# Patient Record
(demographics unavailable — no encounter records)

---

## 2025-01-14 NOTE — HISTORY OF PRESENT ILLNESS
[FreeTextEntry1] : Reviewed prenatal problems requiring potential follow up for feedings, jaundice, kidney/brain/heart/other sonograms, and risk factors for hip dysplasia.   Hospital course and records available reviewed  3420 7-8Oz  7-3 Discharge  39 Weeks Mom A+ Mom Had RSV Shot TCB 7.4 12th TB 4.2 11th Mom and Dad Chinese but G6PD 15  Painful Latch

## 2025-01-14 NOTE — PHYSICAL EXAM
[Alert] : alert [Normocephalic] : normocephalic [Flat Open Anterior [place]] : flat open anterior fontanelle [PERRL] : PERRL [Red Reflex Bilateral] : red reflex bilateral [Normally Placed Ears] : normally placed ears [Auricles Well Formed] : auricles well formed [Clear Tympanic membranes] : clear tympanic membranes [Light reflex present] : light reflex present [Bony structures visible] : bony structures visible [Patent Auditory Canal] : patent auditory canal [Nares Patent] : nares patent [Palate Intact] : palate intact [Uvula Midline] : uvula midline [Supple, full passive range of motion] : supple, full passive range of motion [Symmetric Chest Rise] : symmetric chest rise [Clear to Auscultation Bilaterally] : clear to auscultation bilaterally [Regular Rate and Rhythm] : regular rate and rhythm [S1, S2 present] : S1, S2 present [+2 Femoral Pulses] : +2 femoral pulses [Soft] : soft [Bowel Sounds] : bowel sounds present [Umbilical Stump Dry, Clean, Intact] : umbilical stump dry, clean, intact [Normal external genitailia] : normal external genitalia [Central Urethral Opening] : central urethral opening [Testicles Descended Bilaterally] : testicles descended bilaterally [Patent] : patent [Normally Placed] : normally placed [No Abnormal Lymph Nodes Palpated] : no abnormal lymph nodes palpated [Symmetric Flexed Extremities] : symmetric flexed extremities [Startle Reflex] : startle reflex present [Suck Reflex] : suck reflex present [Rooting] : rooting reflex present [Palmar Grasp] : palmar grasp present [Plantar Grasp] : plantar reflex present [Symmetric Jean-Paul] : symmetric [place] [Acute Distress] : no acute distress [Icteric sclera] : nonicteric sclera [Discharge] : no discharge [Palpable Masses] : no palpable masses [Murmurs] : no murmurs [Tender] : nontender [Distended] : not distended [Hepatomegaly] : no hepatomegaly [Splenomegaly] : no splenomegaly [Lindo-Ortolani] : negative Lindo-Ortolani [Spinal Dimple] : no spinal dimple [Tuft of Hair] : no tuft of hair [Jaundice] : not jaundice [de-identified] : 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 Posterior tongue tie not amenable to ligation and not necessary to laser. Risks and benefits of ligation as well as risks and benefits of not ligating reviewed. Parent provided verbal consent for procedure. The anterior tie was isolated and ligated with less than 1 ml blood loss. Patient tolerated the procedure well. After care reviewed. Follow up as needed for feeding problems, bleeding, irritability, or lethargy. [de-identified] : Mild Jaundice to chest

## 2025-02-12 NOTE — PHYSICAL EXAM
[Alert] : alert [Acute Distress] : no acute distress [Normocephalic] : normocephalic [Flat Open Anterior [place]] : flat open anterior fontanelle [PERRL] : PERRL [Red Reflex Bilateral] : red reflex bilateral [Normally Placed Ears] : normally placed ears [Auricles Well Formed] : auricles well formed [Clear Tympanic membranes] : clear tympanic membranes [Light reflex present] : light reflex present [Bony landmarks visible] : bony landmarks visible [Discharge] : no discharge [Nares Patent] : nares patent [Palate Intact] : palate intact [Uvula Midline] : uvula midline [Supple, full passive range of motion] : supple, full passive range of motion [Palpable Masses] : no palpable masses [Symmetric Chest Rise] : symmetric chest rise [Clear to Auscultation Bilaterally] : clear to auscultation bilaterally [Regular Rate and Rhythm] : regular rate and rhythm [S1, S2 present] : S1, S2 present [Murmurs] : no murmurs [+2 Femoral Pulses] : +2 femoral pulses [Soft] : soft [Tender] : nontender [Distended] : not distended [Bowel Sounds] : bowel sounds present [Hepatomegaly] : no hepatomegaly [Splenomegaly] : no splenomegaly [Normal external genitailia] : normal external genitalia [Central Urethral Opening] : central urethral opening [Testicles Descended Bilaterally] : testicles descended bilaterally [Normally Placed] : normally placed [No Abnormal Lymph Nodes Palpated] : no abnormal lymph nodes palpated [Lindo-Ortolani] : negative Lindo-Ortolani [Symmetric Flexed Extremities] : symmetric flexed extremities [Spinal Dimple] : no spinal dimple [Tuft of Hair] : no tuft of hair [Startle Reflex] : startle reflex present [Suck Reflex] : suck reflex present [Rooting] : rooting reflex present [Palmar Grasp] : palmar grasp reflex present [Plantar Grasp] : plantar grasp reflex present [Symmetric Jean-Paul] : symmetric [place] [Jaundice] : no jaundice [Rash and/or lesion present] : no rash/lesion [FreeTextEntry6] : hydroceles

## 2025-02-12 NOTE — DISCUSSION/SUMMARY
[FreeTextEntry1] : Baby gaining weight beautifully. Normal NBS and G6PD records reviewed. Recommend exclusive breastfeeding, 8-12 feedings per day. Mother should continue prenatal vitamins and avoid alcohol. If formula is needed, recommend iron-fortified formulations, 2-4 oz every 2-3 hrs. When in car, patient should be in rear-facing car seat in back seat. Put baby to sleep on back, in own crib with no loose or soft bedding. Help baby to develop sleep and feeding routines. May offer pacifier if needed. Start tummy time when awake. Limit baby's exposure to others, especially those with fever or unknown vaccine status. Parents counseled to call if rectal temperature >100.4 degrees F.  Discussed possible reflux vs formula intolerance. Family to trial different formula. Consider PPI if symptoms do not improve.   Discussed 2m vaccines today. Return in 1 month for 2m WCC.

## 2025-02-12 NOTE — HISTORY OF PRESENT ILLNESS
[Parents] : parents [Formula ___ oz/feed] : [unfilled] oz of formula per feed [Normal] : Normal [In Bassinet/Crib] : sleeps in bassinet/crib [On back] : sleeps on back [Water heater temperature set at <120 degrees F] : Water heater temperature set at <120 degrees F [Rear facing car seat in back seat] : Rear facing car seat in back seat [Carbon Monoxide Detectors] : Carbon monoxide detectors at home [Smoke Detectors] : Smoke detectors at home.

## 2025-02-27 NOTE — PHYSICAL EXAM
[NL] : regular rate and rhythm, normal S1, S2 audible, no murmurs [Enlarged] : enlarged [Occipital] : occipital [Moves All Extremities x 4] : moves all extremities x4 [Warm] : warm [FreeTextEntry2] : < 1 cm mobile, firm mass at the junction of the parietal and occipital bones.

## 2025-02-27 NOTE — HISTORY OF PRESENT ILLNESS
[de-identified] : lump on head  [FreeTextEntry6] : cradle cap, parents are concerned about a lump on the back of the head.  No other concerns.  Feeding well, no illness.

## 2025-02-27 NOTE — DISCUSSION/SUMMARY
[FreeTextEntry1] : occipital lymphadenopathy vs. dermoid cyst.   Discussed cradle cap care with parents.   To re evaluate in about 4-6 weeks and can consider US if still present to r/o dermoid if still present.

## 2025-02-27 NOTE — HISTORY OF PRESENT ILLNESS
[de-identified] : lump on head  [FreeTextEntry6] : cradle cap, parents are concerned about a lump on the back of the head.  No other concerns.  Feeding well, no illness.

## 2025-03-06 NOTE — PHYSICAL EXAM
[No Acute Distress] : no acute distress [Normocephalic] : normocephalic [NL] : grossly EOMI, no discharge [Normotonic] : normotonic [Warm] : warm [de-identified] : left occipital area with 2 non tender, mobile nodes. ~ 0.5 cm x 1 and 0.75 cm x 1.  [de-identified] : cradle cap to the forehead.  Bilateral great toenail with mild erythema at the nail cuticle junction.

## 2025-03-06 NOTE — HISTORY OF PRESENT ILLNESS
[de-identified] : Red Toe  [FreeTextEntry6] : SUNDAR JOSÉ is a 1 month old male presenting for complaints of redness on both great toes and a second bump on the back of his head.   No fevers. Otherwise he is well.  He was last here on 2/17 with cradle cap and a left occipital lymph node vs dermoid cyst.  Father says that he feels another bump in the same area now.

## 2025-03-06 NOTE — DISCUSSION/SUMMARY
[FreeTextEntry1] : 55 do here with B paronychia's of the great toes and occipital lymphadenopathy.  mupriocin TID x 5 days with warm soaks to the feet.   Discussed natural hx of reactive lymphadenopathy given cradle cap.  Will monitor, advised parents that it can take 1-2 months for reactive lymph nodes to resolve.  If they become painful, red or worsening, parents will follow up.   Follow up PRN worsening symptoms, persistent fever of 100.4 or more or failure to improve.

## 2025-03-06 NOTE — HISTORY OF PRESENT ILLNESS
[de-identified] : Red Toe  [FreeTextEntry6] : SUNDAR JOSÉ is a 1 month old male presenting for complaints of redness on both great toes and a second bump on the back of his head.   No fevers. Otherwise he is well.  He was last here on 2/17 with cradle cap and a left occipital lymph node vs dermoid cyst.  Father says that he feels another bump in the same area now.

## 2025-03-06 NOTE — PHYSICAL EXAM
[No Acute Distress] : no acute distress [Normocephalic] : normocephalic [NL] : grossly EOMI, no discharge [Normotonic] : normotonic [Warm] : warm [de-identified] : left occipital area with 2 non tender, mobile nodes. ~ 0.5 cm x 1 and 0.75 cm x 1.  [de-identified] : cradle cap to the forehead.  Bilateral great toenail with mild erythema at the nail cuticle junction.

## 2025-03-14 NOTE — PHYSICAL EXAM
[Alert] : alert [Normocephalic] : normocephalic [Flat Open Anterior [place]] : flat open anterior fontanelle [PERRL] : PERRL [Red Reflex Bilateral] : red reflex bilateral [Normally Placed Ears] : normally placed ears [Auricles Well Formed] : auricles well formed [Clear Tympanic membranes] : clear tympanic membranes [Light reflex present] : light reflex present [Bony landmarks visible] : bony landmarks visible [Nares Patent] : nares patent [Palate Intact] : palate intact [Uvula Midline] : uvula midline [Supple, full passive range of motion] : supple, full passive range of motion [Symmetric Chest Rise] : symmetric chest rise [Clear to Auscultation Bilaterally] : clear to auscultation bilaterally [Regular Rate and Rhythm] : regular rate and rhythm [S1, S2 present] : S1, S2 present [+2 Femoral Pulses] : +2 femoral pulses [Soft] : soft [Bowel Sounds] : bowel sounds present [Normal external genitailia] : normal external genitalia [Central Urethral Opening] : central urethral opening [Testicles Descended Bilaterally] : testicles descended bilaterally [Normally Placed] : normally placed [No Abnormal Lymph Nodes Palpated] : no abnormal lymph nodes palpated [Symmetric Flexed Extremities] : symmetric flexed extremities [Startle Reflex] : startle reflex present [Suck Reflex] : suck reflex present [Rooting] : rooting reflex present [Palmar Grasp] : palmar grasp reflex present [Plantar Grasp] : plantar grasp reflex present [Symmetric Jean-[place]] : symmetric [place] [Acute Distress] : no acute distress [Discharge] : no discharge [Palpable Masses] : no palpable masses [Murmurs] : no murmurs [Tender] : nontender [Distended] : not distended [Hepatomegaly] : no hepatomegaly [Splenomegaly] : no splenomegaly [Lindo-Ortolani] : negative Lindo-Ortolani [Spinal Dimple] : no spinal dimple [Tuft of Hair] : no tuft of hair [Rash and/or lesion present] : no rash/lesion

## 2025-03-14 NOTE — HISTORY OF PRESENT ILLNESS
[Mother] : mother [Expressed Breast milk ___oz/feed] : [unfilled] oz of expressed breast milk per feed [Formula ___ oz/feed] : [unfilled] oz of formula per feed [Normal] : Normal [Yellow] : yellow [Seedy] : seedy [In Bassinet/Crib] : sleeps in bassinet/crib [Water heater temperature set at <120 degrees F] : Water heater temperature set at <120 degrees F [Rear facing car seat in back seat] : Rear facing car seat in back seat [Carbon Monoxide Detectors] : Carbon monoxide detectors at home [Smoke Detectors] : Smoke detectors at home. [de-identified] : Still having some spit ups. Having difficulty sleeping at night for long stretches. Detail Level: Detailed [de-identified] : Similac sensitive ready to feed, doing better on this formula Detail Level: Zone

## 2025-03-14 NOTE — DISCUSSION/SUMMARY
[] : The components of the vaccine(s) to be administered today are listed in the plan of care. The disease(s) for which the vaccine(s) are intended to prevent and the risks have been discussed with the caretaker.  The risks are also included in the appropriate vaccination information statements which have been provided to the patient's caregiver.  The caregiver has given consent to vaccinate. [FreeTextEntry1] : 2m baby here for WCC, gaining weight and developing beautifully. Discussed sleeping, feeding, stooling, and tummy time. Will receive Vaxelis today. Will come back next month for PCV20 and Rota. Return in 2 months for 4m WCC.  Possible silent reflux. Discussed continuing normal feeds and supportive care with reflux precautions, can consider a small amount of cereal in the bottle closer to 4m if symptoms not improving. Can also consider PPI if needed.  When in car, patient should be in rear-facing car seat in back seat. Put baby to sleep on back, in own crib with no loose or soft bedding. Help baby to maintain sleep and feeding routines. May offer pacifier if needed. Continue tummy time when awake. Parents counseled to call if rectal temperature >100.4 degrees F. Return precautions given.

## 2025-04-14 NOTE — DISCUSSION/SUMMARY
[FreeTextEntry1] : Chief Complaint: Presents to review immunization needs. Discussion with provider.  Nurse reviewed vaccines with provider/ACIP recommendation.   No current symptoms or PMH contraindicating vaccination.  Expectant care. Follow up as needed for fever trend, new, or concerning symptoms.  [] : The components of the vaccine(s) to be administered today are listed in the plan of care. The disease(s) for which the vaccine(s) are intended to prevent and the risks have been discussed with the caretaker.  The risks are also included in the appropriate vaccination information statements which have been provided to the patient's caregiver.  The caregiver has given consent to vaccinate.

## 2025-04-24 NOTE — DISCUSSION/SUMMARY
[FreeTextEntry1] : Anticipatory guidance and parent education given. Will increase Famotidine dosing based on today's weight.   To reduce reflux symptoms follow reflux precautions. Keep the baby upright after eating for 20 to 30 minutes after a feeding.  Keep baby away from cigarette smoke. Thicken formula may be helpful. Add 1-2 tablespoons of oat baby cereal to baby's bottle. Alternatively, activate rice formula is available. Mom to drop off diaper tomorrow to rule out MPI.

## 2025-04-24 NOTE — HISTORY OF PRESENT ILLNESS
[de-identified] : difficulty sleeping [FreeTextEntry6] : BIB mom with c/o irritability and difficulty sleeping.  Pt was started on Famotidine for GERD and was recently increased to BID a few weeks ago. Mom felt it was helping at first but now pt seems uncomfortable again.  Good weight gain since last visit.  May increase dose. Requesting to check diaper for milk allergy

## 2025-05-13 NOTE — HISTORY OF PRESENT ILLNESS
[Parents] : parents [Formula ___ oz/feed] : [unfilled] oz of formula per feed [In Bassinet/Crib] : sleeps in bassinet/crib [Sleeps 12-16 hours per 24 hours (including naps)] : sleeps 12-16 hours per 24 hours (including naps) [Pacifier use] : Pacifier use [Tummy time] : tummy time [Water heater temperature set at <120 degrees F] : Water heater temperature set at <120 degrees F [Rear facing car seat in back seat] : Rear facing car seat in back seat [Carbon Monoxide Detectors] : Carbon monoxide detectors at home [Smoke Detectors] : Smoke detectors at home. [de-identified] : Reflux. Famotidine seems to be helping but still having significant spit ups and back arching.  [de-identified] : Sim Sensitive ready to feed

## 2025-05-13 NOTE — PHYSICAL EXAM
[Alert] : alert [Acute Distress] : no acute distress [Normocephalic] : normocephalic [Flat Open Anterior [place]] : flat open anterior fontanelle [Red Reflex] : red reflex bilateral [PERRL] : PERRL [Normally Placed Ears] : normally placed ears [Auricles Well Formed] : auricles well formed [Clear Tympanic membranes] : clear tympanic membranes [Light reflex present] : light reflex present [Bony landmarks visible] : bony landmarks visible [Discharge] : no discharge [Nares Patent] : nares patent [Palate Intact] : palate intact [Uvula Midline] : uvula midline [Palpable Masses] : no palpable masses [Symmetric Chest Rise] : symmetric chest rise [Clear to Auscultation Bilaterally] : clear to auscultation bilaterally [Regular Rate and Rhythm] : regular rate and rhythm [S1, S2 present] : S1, S2 present [Murmurs] : no murmurs [+2 Femoral Pulses] : (+) 2 femoral pulses [Soft] : soft [Tender] : nontender [Distended] : nondistended [Bowel Sounds] : bowel sounds present [Hepatomegaly] : no hepatomegaly [Splenomegaly] : no splenomegaly [Normal External Genitalia] : normal external genitalia [Circumcised] : circumcised [Central Urethral Opening] : central urethral opening [Testicles Descended] : testicles descended bilaterally [Patent] : patent [Normally Placed] : normally placed [No Abnormal Lymph Nodes Palpated] : no abnormal lymph nodes palpated [Lindo-Ortolani] : negative Lindo-Ortolani [Allis Sign] : negative Allis sign [Spinal Dimple] : no spinal dimple [Tuft of Hair] : no tuft of hair [Startle Reflex] : startle reflex present [Plantar Grasp] : plantar grasp reflex present [Symmetric Jean-Paul] : symmetric jean-paul [Rash or Lesions] : no rash/lesions

## 2025-05-13 NOTE — DISCUSSION/SUMMARY
[] : The components of the vaccine(s) to be administered today are listed in the plan of care. The disease(s) for which the vaccine(s) are intended to prevent and the risks have been discussed with the caretaker.  The risks are also included in the appropriate vaccination information statements which have been provided to the patient's caregiver.  The caregiver has given consent to vaccinate. [FreeTextEntry1] : 4m WCC growing and developing beautifully. Discussed sleeping, feeding, reflux, tummy time, and vaccines. Will get Vaxelis today. Will return in 1 month for PCV20 and Rota, as well as weight check for GERD. Return in 2 months for 6m WCC.  Baby maximized on Famotidine at 1mg/kg/dose BID. Start solids soon, may help. Try to do smaller, more frequent feeds. Trial Elecare or Alimentum for possible milk protein intolerance as baby is still having bloating and discomfort with feeds. Discussed Gelmix as formula thickener. Referral to GI placed.  Continue feeding ad dickson. When in car, patient should be in rear-facing car seat in back seat. Put baby to sleep on back, in own crib with no loose or soft bedding. Lower crib mattress. Help baby to maintain sleep and feeding routines. May offer pacifier if needed. Continue tummy time when awake.

## 2025-06-20 NOTE — DISCUSSION/SUMMARY
[FreeTextEntry1] : feeding choices to reduce risk of allergy reviewed   DC famotidine   Trial yogurt at 9 mos   Risks of spreading vaccines reviewed  [] : The components of the vaccine(s) to be administered today are listed in the plan of care. The disease(s) for which the vaccine(s) are intended to prevent and the risks have been discussed with the caretaker.  The risks are also included in the appropriate vaccination information statements which have been provided to the patient's caregiver.  The caregiver has given consent to vaccinate.

## 2025-06-20 NOTE — HISTORY OF PRESENT ILLNESS
[de-identified] : follow up [FreeTextEntry6] : Feeding well Wants to know if they can try off famotidine

## 2025-06-30 NOTE — PHYSICAL EXAM
[NL] : warm, clear [FreeTextEntry3] :  Bilateral impacted cerumen removed with curette. B injected opaque TMS R>L

## 2025-06-30 NOTE — DISCUSSION/SUMMARY
[FreeTextEntry1] : Untreated ear infections may lead to:  Permanent hearing loss Problems with speech and language development Spread of infection to neighboring tissues and organs, such as mastoiditis or meningitis   Treat as directed and follow up as needed for fever trend, new, or worsening symptoms.  Call or return if rash or significant vomiting develops after starting medication. Some side effects of medication that may not need special treatments include loose BMs.  Continue reflux

## 2025-06-30 NOTE — HISTORY OF PRESENT ILLNESS
[de-identified] : fever [FreeTextEntry6] : Cranky 2-3 days T max 100.8 Eating fair Not lethargic

## 2025-07-02 NOTE — DISCUSSION/SUMMARY
[FreeTextEntry1] : 5 mo here with day 5 fever, croupy cough with stridor during sleep, dehydration and bilateral AOM.  Discussed all of the above with Mom and Grandmother and recommended that child be taken to the ED at [facility] now. Called [facility] ER and sign out was given to resident MD.  Tylenol 3.3 ml given PO at 1130 for fever.  Will follow up with mom via phone and upon discharge.   Spoke with mother at 7PM, child dx'd with epiglottitis and abscess of the neck.  Will be going to OR tonight for drainage with ENT.  Support given to mother, will f/u in the AM via phone.

## 2025-07-02 NOTE — DISCUSSION/SUMMARY
[FreeTextEntry1] : 5 mo here with day 5 fever, croupy cough with stridor during sleep, dehydration and bilateral AOM.  Discussed all of the above with Mom and Grandmother and recommended that child be taken to the ED at [facility] now. Called [facility] ER and sign out was given to resident MD.  Tylenol 3.3 ml given PO at 1130 for fever.  Will follow up with mom via phone and upon discharge.   Spoke with mother at 7PM, child dx'd with epiglottitis and abscess of the neck.  Will be going to OR tonight for drainage with ENT.  Support given to mother, will f/u in the AM via phone.   Dior Lewis RN

## 2025-07-02 NOTE — HISTORY OF PRESENT ILLNESS
[FreeTextEntry6] : SUNDAR JOSÉ is a 5 month old male presenting for complaints of croupy cough. Was seen for ear infection on 6/30 and started on Amoxicillin.  Mom states that he had tactile temperature on Saturday and Sunday, not measured but felt hot.  Last 3 days, measuring temps of 101-102, yesterday had 10 ounces PO and light wet diapers.  Today he is refusing PO, noisy breathing with sleep, no wet diapers.   Croupy cough sounds wet and worse than last few days.

## 2025-07-02 NOTE — PHYSICAL EXAM
[Tired appearing] : tired appearing [Lethargic] : lethargic [Normocephalic] : normocephalic [Erythema] : erythema [NL] : pink nasal mucosa [Erythematous Oropharynx] : erythematous oropharynx [Clear to Auscultation Bilaterally] : clear to auscultation bilaterally [Transmitted Upper Airway Sounds] : transmitted upper airway sounds [Normal S1, S2 audible] : normal S1, S2 audible [Tachycardia] : tachycardia [Soft] : soft [No Abnormal Lymph Nodes Palpated] : no abnormal lymph nodes palpated [Warm] : warm [Crackles] : no crackles [Rhonchi] : no rhonchi [Murmurs] : no murmurs [Tender] : nontender [Distended] : nondistended

## 2025-07-08 NOTE — REASON FOR VISIT
[Initial Evaluation] : an initial evaluation for [Stridor/Noisy Breathing] : stridor/noisy breathing [Mother] : mother [FreeTextEntry2] : hospital follow up

## 2025-07-08 NOTE — PHYSICAL EXAM
[Normal Gait and Station] : normal gait and station [Normal muscle strength, symmetry and tone of facial, head and neck musculature] : normal muscle strength, symmetry and tone of facial, head and neck musculature [Normal] : no cervical lymphadenopathy [Effusion] : no effusion [Exposed Vessel] : left anterior vessel not exposed [Increased Work of Breathing] : no increased work of breathing with use of accessory muscles and retractions [de-identified] : well appearing [de-identified] : palate intact

## 2025-07-08 NOTE — PHYSICAL EXAM
[Normal Gait and Station] : normal gait and station [Normal muscle strength, symmetry and tone of facial, head and neck musculature] : normal muscle strength, symmetry and tone of facial, head and neck musculature [Normal] : no cervical lymphadenopathy [Effusion] : no effusion [Exposed Vessel] : left anterior vessel not exposed [Increased Work of Breathing] : no increased work of breathing with use of accessory muscles and retractions [de-identified] : well appearing [de-identified] : palate intact

## 2025-07-08 NOTE — ASSESSMENT
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 Risks including but not limited to pain, bleeding, swelling, infection, scarring damage to lips, teeth, gums, parts of the oral cavity, oropharynx and airway, risk for further procedures, and risk of anesthesia (which will be discussed with you by the anesthesiologist).  Benefits include the diagnosis or surveillance of an underlying condition and treatment of an underlying condition. Alternatives include observation, and other diagnostic studies. During observation, if there is an underlying condition there is a risk that it could progress. Photodocumentation including pictures and video with or without sound may be taken, and effort will be made to maintain respect for privacy and confidentiality.

## 2025-07-08 NOTE — HISTORY OF PRESENT ILLNESS
[de-identified] : Today I had the pleasure of seeing SUNDAR JOSÉ. SUNDAR is a 5 month boy who presents for: hospital follow up History was obtained from patient, parent and chart.   Admitted to [facility] following 5 days of fevers, poor PO, stridor, bilateral AOM, discomfort when supine.  in the ER FFL with severe epiglottic edema and airway obstruction consistent with retropharyngeal abscess with edema,  significant improvement in breathing following one dose of decadron sufficient improvement to obtain imaging CT neck with large abscess abutting the airway s/p DLB I&D pharyngoepiglottic cyst copious mahsa purulence 7/2/25 required postop intubation until 7/4/25 and was discharged on 7/7/25 on antibiotics   [de-identified] : breathing improving, denies stridor  No respiratory distress Taking 4 ounces per feed, slow to feed on abx until 6/16

## 2025-07-08 NOTE — CONSULT LETTER
[Dear  ___] : Dear  [unfilled], [Courtesy Letter:] : I had the pleasure of seeing your patient, [unfilled], in my office today. [Please see my note below.] : Please see my note below. [Consult Closing:] : Thank you very much for allowing me to participate in the care of this patient.  If you have any questions, please do not hesitate to contact me. [Sincerely,] : Sincerely, [FreeTextEntry2] : Dr. Daniel Maxwell [address] [place] [address]  [phone] [FreeTextEntry3] : Cleopatra Prasad MD Pediatric Otolaryngology / Head and Neck Surgery    [facility] [address] [place] [address] Tel [phone] Fax [phone]    2 [facility], [facility] [address] [place] [address]  Tel [phone] Fax [phone]

## 2025-07-08 NOTE — CONSULT LETTER
[Dear  ___] : Dear  [unfilled], [Courtesy Letter:] : I had the pleasure of seeing your patient, [unfilled], in my office today. [Please see my note below.] : Please see my note below. [Consult Closing:] : Thank you very much for allowing me to participate in the care of this patient.  If you have any questions, please do not hesitate to contact me. [Sincerely,] : Sincerely, [FreeTextEntry2] : Dr. Daniel Maxwell [address] [place] [address]  [phone] [FreeTextEntry3] : Cleopatra Prasad MD Pediatric Otolaryngology / Head and Neck Surgery    [facility] [address] [place] [address] Tel [phone] Fax [phone]    0 [facility], [facility] [address] [place] [address]  Tel [phone] Fax [phone]

## 2025-07-08 NOTE — HISTORY OF PRESENT ILLNESS
[de-identified] : Today I had the pleasure of seeing SUNDAR JOSÉ. SUNDAR is a 5 month boy who presents for: hospital follow up History was obtained from patient, parent and chart.   [place] [facility] [place] 5 days of fevers, poor PO, stridor, bilateral AOM, discomfort when supine.  in the ER FFL with severe epiglottic edema and airway obstruction consistent with retropharyngeal abscess with edema,  significant improvement in breathing following one dose of decadron sufficient improvement to obtain imaging CT neck with large abscess abutting the airway s/p DLB I&D pharyngoepiglottic cyst copious mahsa purulence 7/2/25 required postop intubation until 7/4/25 and was discharged on 7/7/25 on antibiotics   [de-identified] : breathing improving, denies stridor  No respiratory distress Taking 4 ounces per feed, slow to feed on abx until 6/16

## 2025-07-17 NOTE — DATA REVIEWED
[Clinical lab tests/radiology test reviewed] : clinical lab tests/radiology test reviewed [Reviewed and summarized previous records] : reviewed and summarized previous records [de-identified] : Platelet count elevated after surgery (842K)

## 2025-07-17 NOTE — CONSULT LETTER
[Dear  ___] : Dear  [unfilled], [Courtesy Letter:] : I had the pleasure of seeing your patient, [unfilled], in my office today. [Please see my note below.] : Please see my note below. [Consult Closing:] : Thank you very much for allowing me to participate in the care of this patient.  If you have any questions, please do not hesitate to contact me. [Sincerely,] : Sincerely, [FreeTextEntry3] : Carter Houston MD Infectious Diseases, [facility] Professor, [facility] [employer] [place] [address] Tel: [phone]  Fax: [phone]

## 2025-07-17 NOTE — REASON FOR VISIT
[Follow-Up Consultation] : a follow-up consultation visit for [Mother] : mother [Medical Records] : medical records [FreeTextEntry3] : parapharyngeal abscess

## 2025-07-17 NOTE — HISTORY OF PRESENT ILLNESS
[FreeTextEntry2] : FROM DISCHARGE SUMMARY: Attendinm with [facility] s/p OR on . 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 Will continue augmentin. F/u ENT on 7/10 and ID in 1-2 weeks.  Telehealth follow-up visit today 25: The service was provided using 2-way audio visual platform -              The location of the patient was their home -              The location of the provider was [facility] -              Mom and grandma were present on video, and briefly showed Nely. Luca has behaved normally after he went home. He has been taking more than 8 oz each feed, and the sleeping schedule is back on track. Mom notes slight snoring, but no stridor. He has already completed the course of amoxicillin clavulanic acid he was sent home on. He saw Dr. Prasad this morning. Cyst removal is scheduled for .

## 2025-07-17 NOTE — HISTORY OF PRESENT ILLNESS
[de-identified] : Today I had the pleasure of seeing SUNDAR JOSÉ for follow up.  History was obtained from patient, mother and chart. 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

## 2025-07-17 NOTE — CONSULT LETTER
[Dear  ___] : Dear  [unfilled], [Courtesy Letter:] : I had the pleasure of seeing your patient, [unfilled], in my office today. [Please see my note below.] : Please see my note below. [Consult Closing:] : Thank you very much for allowing me to participate in the care of this patient.  If you have any questions, please do not hesitate to contact me. [Sincerely,] : Sincerely, [FreeTextEntry2] : Dr. Daniel Maxwell [address] [place] [address]  [phone] [FreeTextEntry3] : Cleopatra Prasad MD Pediatric Otolaryngology / Head and Neck Surgery    [facility] [address] [place] [address] Tel [phone] Fax [phone]    2 [facility], [facility] [address] [place] [address]  Tel [phone] Fax [phone]

## 2025-07-17 NOTE — ASSESSMENT
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 Risks including but not limited to pain, bleeding, swelling, infection, scarring damage to lips, teeth, gums, parts of the oral cavity, oropharynx and airway, risk for further procedures, and risk of anesthesia (which will be discussed with you by the anesthesiologist).  Benefits include the diagnosis or surveillance of an underlying condition and treatment of an underlying condition. Alternatives include observation, and other diagnostic studies. During observation, if there is an underlying condition there is a risk that it could progress. Photodocumentation including pictures and video with or without sound may be taken, and effort will be made to maintain respect for privacy and confidentiality.

## 2025-07-17 NOTE — PHYSICAL EXAM
[Normal Gait and Station] : normal gait and station [Normal muscle strength, symmetry and tone of facial, head and neck musculature] : normal muscle strength, symmetry and tone of facial, head and neck musculature [Normal] : no cervical lymphadenopathy [Effusion] : no effusion [Exposed Vessel] : left anterior vessel not exposed [Increased Work of Breathing] : no increased work of breathing with use of accessory muscles and retractions [de-identified] : well appearing [de-identified] : palate intact

## 2025-07-24 NOTE — HISTORY OF PRESENT ILLNESS
[de-identified] : Today I had the pleasure of seeing SUNDAR JOSÉ. SUNDAR is a 6 month boy who presents for: pharyngoepiglolttic cysth History was obtained from patient, parent and chart.  s/p DLB I&D pharyngoepiglottic cyst copious mahsa purulence 7/2/25 required postop intubation until 7/4/25 and was discharged on 7/7/25 on antibiotics [de-identified] : doing well, no noisy breathing, when waking up sounds like he has some secretions he needs to clear

## 2025-07-24 NOTE — PHYSICAL EXAM
[Effusion] : no effusion [Exposed Vessel] : left anterior vessel not exposed [Increased Work of Breathing] : no increased work of breathing with use of accessory muscles and retractions [Normal Gait and Station] : normal gait and station [Normal muscle strength, symmetry and tone of facial, head and neck musculature] : normal muscle strength, symmetry and tone of facial, head and neck musculature [Normal] : no cervical lymphadenopathy [de-identified] : well appearing [de-identified] : palate intact

## 2025-07-24 NOTE — ASSESSMENT
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 Risks including but not limited to pain, bleeding, swelling, infection, scarring damage to lips, teeth, gums, parts of the oral cavity, oropharynx and airway, risk for further procedures, and risk of anesthesia (which will be discussed with you by the anesthesiologist).  Benefits include the diagnosis or surveillance of an underlying condition and treatment of an underlying condition. Alternatives include observation, and other diagnostic studies. During observation, if there is an underlying condition there is a risk that it could progress. Photodocumentation including pictures and video with or without sound may be taken, and effort will be made to maintain respect for privacy and confidentiality.

## 2025-07-28 NOTE — HISTORY OF PRESENT ILLNESS
[Mother] : mother [Formula ___ oz/feed] : [unfilled] oz of formula per feed [Fruits] : fruits [Vegetables] : vegetables [Normal] : Normal [In Bassinet/Crib] : sleeps in bassinet/crib [Sleeps 12-16 hours per 24 hours (including naps)] : sleeps 12-16 hours per 24 hours (including naps) [Tummy time] : tummy time [Water heater temperature set at <120 degrees F] : Water heater temperature set at <120 degrees F [Rear facing car seat in back seat] : Rear facing car seat in back seat [Carbon Monoxide Detectors] : Carbon monoxide detectors at home [Smoke Detectors] : Smoke detectors at home. [de-identified] : Has pharyngoepiglottic cyst, following ENT, has excision and marsupialization of cyst on 08/20/25. [de-identified] : Slow start on solids due to cyst

## 2025-07-28 NOTE — DEVELOPMENTAL MILESTONES
[Normal Development] : Normal Development [Begins to turn when name called] : begins to turn when name called [Babbles] : babbles [Rolls over prone to supine] : rolls over prone to supine [Sits briefly without support] : sits briefly without support [Reaches for object and transfers] : reaches for object and transfers [Rakes small object with 4 fingers] : rakes small object with 4 fingers [[place] small object on surface] : bangs small object on surface [Passed] : passed

## 2025-07-28 NOTE — PHYSICAL EXAM
[Alert] : alert [Normocephalic] : normocephalic [Flat Open Anterior [place]] : flat open anterior fontanelle [Red Reflex] : red reflex bilateral [PERRL] : PERRL [Normally Placed Ears] : normally placed ears [Auricles Well Formed] : auricles well formed [Clear Tympanic membranes] : clear tympanic membranes [Light reflex present] : light reflex present [Bony landmarks visible] : bony landmarks visible [Nares Patent] : nares patent [Palate Intact] : palate intact [Uvula Midline] : uvula midline [Supple, full passive range of motion] : supple, full passive range of motion [Symmetric Chest Rise] : symmetric chest rise [Clear to Auscultation Bilaterally] : clear to auscultation bilaterally [Regular Rate and Rhythm] : regular rate and rhythm [S1, S2 present] : S1, S2 present [+2 Femoral Pulses] : (+) 2 femoral pulses [Soft] : soft [Bowel Sounds] : bowel sounds present [Central Urethral Opening] : central urethral opening [Testicles Descended] : testicles descended bilaterally [Patent] : patent [Normally Placed] : normally placed [No Abnormal Lymph Nodes Palpated] : no abnormal lymph nodes palpated [Symmetric Buttocks Creases] : symmetric buttocks creases [Plantar Grasp] : plantar grasp reflex present [Cranial Nerves Grossly Intact] : cranial nerves grossly intact [Acute Distress] : no acute distress [Discharge] : no discharge [Tooth Eruption] : tooth eruption [Palpable Masses] : no palpable masses [Murmurs] : no murmurs [Tender] : nontender [Distended] : nondistended [Hepatomegaly] : no hepatomegaly [Splenomegaly] : no splenomegaly [Lindo-Ortolani] : negative Lindo-Ortolani [Allis Sign] : negative Allis sign [Spinal Dimple] : no spinal dimple [Tuft of Hair] : no tuft of hair [Rash or Lesions] : no rash/lesions

## 2025-07-28 NOTE — DISCUSSION/SUMMARY
[] : The components of the vaccine(s) to be administered today are listed in the plan of care. The disease(s) for which the vaccine(s) are intended to prevent and the risks have been discussed with the caretaker.  The risks are also included in the appropriate vaccination information statements which have been provided to the patient's caregiver.  The caregiver has given consent to vaccinate. [FreeTextEntry1] : 6m WCC growing and developing beautifully. Discussed sleeping, feeding, solids, tummy time, and vaccines. Stay on Alimentum and Famotidine until surgery. Passed vision, did not pass hearing in the right ear but child was moving during exam. No concerns about hearing difficulties at this time. Will get Vaxelis, PCV20, and Rota today. Return in 3 months for 9m WCC.   Recommend breastfeeding, 8-12 feedings per day. If formula is needed, 2-4 oz every 3-4 hrs. Introduce single-ingredient foods rich in iron, one at a time. Incorporate up to 4 oz of fluorinated water daily in a sippy cup. When teeth erupt wipe daily with washcloth. When in car, patient should be in rear-facing car seat in back seat. Put baby to sleep on back, in own crib with no loose or soft bedding. Lower crib mattress. Help baby to maintain sleep and feeding routines. May offer pacifier if needed. Continue tummy time when awake. Ensure home is safe since baby is now more mobile. Do not use infant walker. Read aloud to baby.